# Patient Record
Sex: MALE | Race: BLACK OR AFRICAN AMERICAN | URBAN - METROPOLITAN AREA
[De-identification: names, ages, dates, MRNs, and addresses within clinical notes are randomized per-mention and may not be internally consistent; named-entity substitution may affect disease eponyms.]

---

## 2017-01-31 PROBLEM — R10.9 ABDOMINAL PAIN: Status: ACTIVE | Noted: 2017-01-31

## 2018-08-23 ENCOUNTER — OFFICE VISIT (OUTPATIENT)
Dept: SLEEP MEDICINE | Facility: CLINIC | Age: 33
End: 2018-08-23
Payer: COMMERCIAL

## 2018-08-23 VITALS
WEIGHT: 195.56 LBS | HEIGHT: 70 IN | HEART RATE: 69 BPM | DIASTOLIC BLOOD PRESSURE: 66 MMHG | SYSTOLIC BLOOD PRESSURE: 112 MMHG | OXYGEN SATURATION: 98 % | BODY MASS INDEX: 28 KG/M2

## 2018-08-23 DIAGNOSIS — G47.50 PARASOMNIA: ICD-10-CM

## 2018-08-23 DIAGNOSIS — G47.30 SLEEP APNEA, UNSPECIFIED TYPE: Primary | ICD-10-CM

## 2018-08-23 DIAGNOSIS — G47.8 SLEEP PARALYSIS: ICD-10-CM

## 2018-08-23 DIAGNOSIS — E66.3 OVERWEIGHT (BMI 25.0-29.9): ICD-10-CM

## 2018-08-23 PROCEDURE — 99999 PR PBB SHADOW E&M-NEW PATIENT-LVL III: CPT | Mod: PBBFAC,,, | Performed by: NURSE PRACTITIONER

## 2018-08-23 PROCEDURE — 99203 OFFICE O/P NEW LOW 30 MIN: CPT | Mod: S$GLB,,, | Performed by: NURSE PRACTITIONER

## 2018-08-23 NOTE — PROGRESS NOTES
"Luis Daniel Vazquez  was seen as a new patient for the evaluation of obstructive sleep apnea and parasomnias.     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       Patient complaints include: snoring, air gasping, witnessed apneas, and excessive daytime sleepiness "for years".   Able to function at work, sleepiness not as pronounced as long as engaged, but given opportunity easily falls asleep  Would like improvement in concentration and energy level  Despite opportunity for sleep, sleep is not restorative     Recently had cholesterol panel completed and LDL elevated despite diet and exercise. PCP inquired about snoring and recommended RADHA eval.     Snoring improves with use of universal-size oral appliance purchased online  Sometimes sleep is a little more refreshing with dental device and if he sleeps prone     Of note, pt has also had regular episodes of sleep paralysis since his teenage years; occurs 2 - 3 times per week   Describes episodes as knowing he is awake but can't wake up or move; sometimes feels like he is asking wife to wake him, but ultimately not actually verbal   Sleep paralysis accompanied by hallucinations, either some one breaking in or someone trying to harm him  Have learned over the years coping skills to reassure himself he is okay, but still finds episodes disturbing  Timing of sleep paralysis have happened as falling asleep and waking up  Denies cataplexy or irrepressible daytime sleepiness.     Denies symptoms of restless legs or kicking during sleep.    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 a theatre or a meeting) 3   As a passenger in a car for an hour " without a break 3   Lying down to rest in the afternoon when circumstances permit 3   Sitting and talking to someone 1   Sitting quietly after a lunch without alcohol 3   In a car, while stopped for a few minutes in traffic 2   Total score 20       SLEEP ROUTINE:    Sleep Clinic New Patient 8/23/2018   What time do you go to bed on a week day? (Give a range) 9-10   What time do you go to bed on a day off? (Give a range) 9-10   How long does it take you to fall asleep? (Give a range) 5 mins   On average, how many times per night do you wake up? 1   How long does it take you to fall back into sleep? (Give a range) 5 mins   What time do you wake up to start your day on a week day? (Give a range) 5-6am   What time do you wake up to start your day on a day off? (Give a range) 6:30-8:30   What time do you get out of bed? (Give a range) immediately   On average, how many hours do you sleep? 7   On average, how many naps do you take per day? 0   Rate your sleep quality from 0 to 5 (0-poor, 5-great). 2   Have you experienced:  Weight loss?   How much weight have you lost or gained (in lbs.) in the last year? 10 pounds   On average, how many times per night do you go to the bathroom?  1   Have you ever had a sleep study/CPAP machine/surgery for sleep apnea? No   Have you ever had a CPAP machine for sleep apnea? No   Have you ever had surgery for sleep apnea? No         PAST MEDICAL HISTORY:    Active Ambulatory Problems     Diagnosis Date Noted    Kidney stones     Hyperlipidemia     Abdominal pain 01/31/2017     Resolved Ambulatory Problems     Diagnosis Date Noted    Peroneal tendinitis of left lower extremity 05/30/2012    HTN (hypertension)     Vitamin D deficiency      Past Medical History:   Diagnosis Date    Hyperlipidemia     Kidney stones     Vitamin D deficiency                 PAST SURGICAL HISTORY:    Past Surgical History:   Procedure Laterality Date    KIDNEY STENT           FAMILY HISTORY:              "   No family history on file.    SOCIAL HISTORY:          Tobacco:   Social History     Tobacco Use   Smoking Status Never Smoker       Alcohol use:    Social History     Substance and Sexual Activity   Alcohol Use Yes    Comment: 1 drink per day                 ALLERGIES:  Review of patient's allergies indicates:  No Known Allergies    CURRENT MEDICATIONS:    Current Outpatient Medications   Medication Sig Dispense Refill    cholecalciferol, vitamin D3, (VITAMIN D3) 2,000 unit Cap Take 1 capsule by mouth once daily.      krill-om-3-dha-epa-phospho-ast (KRILL OIL) 1,181-903-04-50 mg Cap Take 1 capsule by mouth once daily at 6am.      rosuvastatin (CRESTOR) 5 MG tablet Take 1 tablet (5 mg total) by mouth once daily. 90 tablet 3     No current facility-administered medications for this visit.                   REVIEW OF SYSTEMS:     Sleep related symptoms as per HPI.  Sleep Clinic ROS  8/23/2018   Difficulty breathing through the nose?  Yes   Sore throat or dry mouth in the morning? Sometimes   Irregular or very fast heart beat?  Yes   Shortness of breath?  Yes   Acid reflux? No   Body aches and pains?  Yes   Morning headaches? Yes   Dizziness? No   Mood changes?  Yes   Do you exercise?  Yes   Do you feel like moving your legs a lot?  No       Otherwise, a balance of systems reviewed is negative.          PHYSICAL EXAM:  /66 (BP Location: Left arm, Patient Position: Sitting, BP Method: Large (Manual))   Pulse 69   Ht 5' 10" (1.778 m)   Wt 88.7 kg (195 lb 8.8 oz)   SpO2 98%   BMI 28.06 kg/m²   GENERAL: Normal development, well groomed  HEENT:  Conjunctivae are non-erythematous; Pupils equal, round, and reactive to light; Nose is symmetrical; Nasal mucosa is pink and moist; Septum is midline; Inferior turbinates are normal; Nasal airflow is normal; Posterior pharynx is pink; Modified Mallampati: IV; Posterior palate is normal; Tonsils +1; Uvula is normal and pink;Tongue is normal; Dentition is fair; No TMJ " tenderness; Jaw opening and protrusion without click and without discomfort.  NECK: Supple. Neck circumference is 14 inches. No thyromegaly. No palpable nodes.    SKIN: On face and neck: No abrasions, no rashes, no lesions.  No subcutaneous nodules are palpable.  RESPIRATORY: Normal chest expansion and non-labored breathing at rest.  CARDIOVASCULAR: Normal rate.  EXTREMITIES: No edema. No clubbing. No cyanosis. Station normal. Gait normal.        NEURO/PSYCH: Oriented to time, place and person. Normal attention span and concentration. Affect is full. Mood is normal.                                              ASSESSMENT:    Sleep apnea, unspecified. The patient symptomatically has snoring, air gasping, witnessed apneas, and excessive daytime sleepiness with findings of crowded oral airway and elevated body mass index. Medical co-morbidities: high LDL and overweight BMI.  This warrants further investigation for possible obstructive sleep apnea.      Parasomnia, ongoing regular sleep paralysis since teens associated with hypnagogic and hyponopompic hallucinations usually involves someone harming him, vivid dreams; pt without causative medications     PLAN:    Diagnostic: Polysomnogram w/ parasomnia montage, a home sleep test can only evaluate sleep-disordered breathing and will not be sufficient in evaluating sleep behavior disorder.  The nature of this procedure and its indication was discussed with the patient. Pt understands that in the event, only HST is authorized that this is limited to RADHA eval.  Patient will be contacted after sleep study is done - personally call with results.       Discussed etiology and causative factors of sleep paralysis including that RADHA may be underlying cause and if addressed may resolve symptom of sleep paralysis. Briefly discussed potential pharmacologic management of parasomnia even if not shown on PSG; will further discuss in future.     Education: During our discussion today, we  talked about the etiology of obstructive sleep apnea as well as the potential ramifications of untreated sleep apnea, which could include daytime sleepiness, hypertension, heart disease and/or stroke. We discussed potential treatment options, which could include weight loss, body positioning, continuous positive airway pressure (CPAP), OA, EPAP, or referral for surgical consideration.     Precautions: The patient was advised to abstain from driving should they feel sleepy  or drowsy.     Thank you for allowing me the opportunity to participate in the care of your patient.

## 2018-09-11 ENCOUNTER — HOSPITAL ENCOUNTER (OUTPATIENT)
Dept: SLEEP MEDICINE | Facility: OTHER | Age: 33
Discharge: HOME | End: 2018-09-11
Attending: NURSE PRACTITIONER
Payer: COMMERCIAL

## 2018-09-11 ENCOUNTER — TELEPHONE (OUTPATIENT)
Dept: SLEEP MEDICINE | Facility: OTHER | Age: 33
End: 2018-09-11

## 2018-09-11 DIAGNOSIS — G47.33 OSA (OBSTRUCTIVE SLEEP APNEA): ICD-10-CM

## 2018-09-11 DIAGNOSIS — G47.8 SLEEP PARALYSIS: ICD-10-CM

## 2018-09-11 DIAGNOSIS — G47.50 PARASOMNIA: ICD-10-CM

## 2018-09-11 DIAGNOSIS — G47.30 SLEEP APNEA, UNSPECIFIED TYPE: ICD-10-CM

## 2018-09-11 PROCEDURE — 95810 POLYSOM 6/> YRS 4/> PARAM: CPT

## 2018-09-11 PROCEDURE — 95810 POLYSOM 6/> YRS 4/> PARAM: CPT | Mod: 26,,, | Performed by: INTERNAL MEDICINE

## 2018-09-11 NOTE — TELEPHONE ENCOUNTER
Tried calling work,cell and home number to schedule the sleep study.  Not able to leave a message.

## 2018-09-20 ENCOUNTER — TELEPHONE (OUTPATIENT)
Dept: SLEEP MEDICINE | Facility: CLINIC | Age: 33
End: 2018-09-20

## 2020-09-10 ENCOUNTER — TELEPHONE (OUTPATIENT)
Dept: GENETICS | Facility: CLINIC | Age: 35
End: 2020-09-10

## 2021-09-25 NOTE — PROGRESS NOTES
Alison Shukla to [facility] on 9/11/18 for an overnight baseline study. Patient educated on set up and CPAP prior to the start of the study.     Patient did not meet criteria for CPAP. Some events observed. Soft to moderate snoring observed.     Extended EMG and EEG monatge in place. No abnormal occurances observed.   Patient did complain of back pain from sleeping supine.    EKG Lead II appears in NSR.     Post study information given to patient in AM.   No PCI

## 2022-12-30 ENCOUNTER — IMMUNIZATION (OUTPATIENT)
Dept: PRIMARY CARE CLINIC | Facility: CLINIC | Age: 37
End: 2022-12-30
Payer: COMMERCIAL

## 2022-12-30 DIAGNOSIS — Z23 NEED FOR VACCINATION: Primary | ICD-10-CM

## 2022-12-30 PROCEDURE — 91312 COVID-19, MRNA, LNP-S, BIVALENT BOOSTER, PF, 30 MCG/0.3 ML DOSE: CPT | Mod: PBBFAC | Performed by: INTERNAL MEDICINE

## 2022-12-30 PROCEDURE — 0124A COVID-19, MRNA, LNP-S, BIVALENT BOOSTER, PF, 30 MCG/0.3 ML DOSE: CPT | Mod: CV19,PBBFAC | Performed by: INTERNAL MEDICINE

## 2023-12-01 ENCOUNTER — TELEPHONE (OUTPATIENT)
Dept: SLEEP MEDICINE | Facility: CLINIC | Age: 38
End: 2023-12-01
Payer: COMMERCIAL
